# Patient Record
(demographics unavailable — no encounter records)

---

## 2025-03-26 NOTE — HISTORY OF PRESENT ILLNESS
[de-identified] : 72 year old female, s/p left patellar fracture (7/2021) treated conservatively by Dr. Pimentel and also been treated for left knee OA by Dr. Pimentel presents with left knee pain for about a week. She was recently seen and had a flare up of the knee while on a cruise. She was given a cortisone injection which only lasted for 3 days. She states that her knee is really been painful and uncomfortable. She is here because she wanted to know what else she can do for the knee. At this visit her knee symptoms are not that bad but the past week it was really acting up.  She has been taking Motrin and icing which does seem to help but her symptoms are really bad at times.

## 2025-03-26 NOTE — PHYSICAL EXAM
[Slightly Antalgic] : slightly antalgic [Cane] : ambulates with cane [LE] : Sensory: Intact in bilateral lower extremities [Knee] : patellar 2+ and symmetric bilaterally [Normal] : Alert and in no acute distress [Poor Appearance] : well-appearing [de-identified] : Pt is a pleasant 72 year old female, AAOx3 with no apparent distress, 37.9 BMI. Physical examination of left knee reveals normal contours with no deformity, skin intact, with no signs of infection, no erythema, mild swelling, no discoloration, no distal lymphedema, no patholaxity, no muscle atrophy noted. ROM of left knee reveals .  There is pain on palpation to the medial joint line. No pain with valgus stress test. Neg Apley Compression test. There are no neurological deficits. Motor strength 5/5 throughout the arc of motion. No neurological deficits noted. +1 PT pulses

## 2025-03-26 NOTE — CONSULT LETTER
[Dear  ___] : Dear  [unfilled], [Consult Letter:] : I had the pleasure of evaluating your patient, [unfilled]. [FreeTextEntry1] : I had the pleasure of evaluating your patient in the office today for complaints of left knee pain secondary to osteoarthritis . I have enclosed a copy of today's office notes for your charts and for your review.  Sincerely,   [facility] Department of Orthopedic Surgery [facility] [place]

## 2025-03-26 NOTE — DISCUSSION/SUMMARY
[de-identified] : Pt is a pleasant 71 year old female, with left knee pain secondary to exacerbation of osteoarthritis. She did not get much relief from the previous cortisone injection about a month ago. She was advised that she is unable to get any injection therapy at this visit. She was advised on the use of NSAIDs, icing, Tylenol and activity modification. She had questions about total knee replacement surgery and all questions were answered about them. She is still leaning on conservative treatment at this visit. She was advised to follow up with Dr. Pimentel which she has an appointment on 4/8/2025. She was advised to continue to wear a brace also on long walks. The patient will contact me if there are any concerns.  Patient to follow up with Dr. Pimentel. The patient expressed understanding and all questions were answered.

## 2025-04-08 NOTE — CONSULT LETTER
[FreeTextEntry1] : I had the pleasure of evaluating your patient in the office today for left knee osteoarthritis, preop optimization for TKA. I have enclosed a copy of today's office notes for your charts and for your review. The patient will require full medical clearance if she wishes to proceed with surgery.   Sincerely,   Hever Pimentel M.D. Professor and  Department of Orthopedic Surgery [facility] [place]

## 2025-04-08 NOTE — PHYSICAL EXAM
[Poor Appearance] : well-appearing [de-identified] : Pt is a pleasant 72 year old female, AAOx3 with no apparent distress, 35.7 BMI. Physical examination of left knee reveals normal contours with no deformity, skin intact, with no signs of infection, no erythema, no swelling, no discoloration, no distal lymphedema, no patholaxity, no muscle atrophy noted. ROM of left knee reveals flex/ext: 0-115. Medial joint line TTP. DP/PT 1+. No neurological deficits noted. [de-identified] : X-rays from 2/26/25 were reviewed by me today 4/8/25: 4 views of the left knee demonstrate tricompartmental arthritis, with no acute fracture or dislocation.

## 2025-04-08 NOTE — HISTORY OF PRESENT ILLNESS
[de-identified] : 72 year old female, s/p left patellar fracture (7/2021) treated conservatively and also been treated for left knee OA presents for follow up. She was really in a lot of pain in February after her cruise ship and was seen by Mr. Bains and had a cortisone injection which did help. She is here today for a discussion for possible having a total knee replaceemnt. The pain is a 5/10 at this visit and can get to an 8/10.  She is here because she wanted to know what else she can do for the knee. She has been taking Motrin and icing which does seem to help but her symptoms are really bad at times.   Hx: Pre JOHN - Miley 
[id number]

## 2025-04-08 NOTE — DISCUSSION/SUMMARY
[Surgical risks reviewed] : Surgical risks reviewed [de-identified] : Pt is a pleasant 72 year old female, with left knee pain secondary to osteoarthritis. A lengthy discussion was held regarding the patient's condition and treatment options including all risks, benefits, prognosis and outcomes of each were discussed in detail.  A new PT script was provided today.   The patient inquired about having both knees done at same time and was educated that pt needs to be as close to age 60 as possible, no medical problems, low BMI, equivalent pain, and pt has to be tough as nails.  Discussed w/ patient importance of BMI/weight reduction, with goal of 10-20 lb, patient reports she has been losing weight. This will be through a combination of diet and a low impact exercise program swimming, stationary bike vs peloton, elliptical, walking w/ weights, etc. Pt advised that the first month is toughest and pt will not be allowed to drive for 1 month postop. Pt will be cleared for travel 2 months postop.  Surgery was scheduled at today's office visit. The risks and benefits of surgical intervention were reviewed with the patient including but not limited to bleeding, infection, failure of surgery and the risk of reoperation. An extensive discussion was conducted of the natural history of the disease and the variety of surgical and non-surgical treatment options available to the patient. A risk/benefit analysis was discussed with the patient reviewing the advantages and disadvantages of surgical intervention at this time. Both the level and length of the patient's pain have made additional conservative treatment measures consisting of NSAIDs, physical therapy, corticosteroids contraindicated. A full explanation was given of the nature and the purpose of the procedure and anesthesia, its benefits, possible alternative methods of diagnosis or treatment, the risks involved, the possibility of complications, the foreseeable consequences of the procedure and the possible results of the non-treatment. I reviewed the plan of care. The patient agrees with the plan of care. An education pamphlet and material were also given to the patient for review.  No guarantee or assurance was made as to the results that may be obtained. Specifically, the risks were identified to include, but are not limited to the following: Infection, phlebitis, pulmonary embolism, death, paralysis, dislocation, pain, stiffness, instability, limp, weakness, breakage, limb-length inequality, uncontrolled bleeding, nerve injury, blood vessel injury, pressure sores, anesthetic risks, delayed healing of wound and bone. Further discussion was undertaken with the patient about the details of surgical preparation, treatment, and postoperative rehabilitation including medical clearance, autotransfusion, the hospital course, and the postoperative rehabilitation involved. All in all, I feel that this patient is a good candidate for surgery.   Patient was advised that Mounjaro will need to be stopped prior to surgery.   The patient expressed understanding and all questions were answered. The patient will contact me on how she is doing otherwise follow up will be after surgery, which pt prefers to schedule for after summer.